# Patient Record
Sex: FEMALE
[De-identification: names, ages, dates, MRNs, and addresses within clinical notes are randomized per-mention and may not be internally consistent; named-entity substitution may affect disease eponyms.]

---

## 2019-07-16 ENCOUNTER — HOSPITAL ENCOUNTER (OUTPATIENT)
Dept: HOSPITAL 90 - LDH | Age: 24
Setting detail: OBSERVATION
LOS: 1 days | Discharge: HOME | End: 2019-07-17
Attending: SPECIALIST | Admitting: SPECIALIST
Payer: MEDICAID

## 2019-07-16 VITALS — DIASTOLIC BLOOD PRESSURE: 81 MMHG | SYSTOLIC BLOOD PRESSURE: 126 MMHG

## 2019-07-16 VITALS — DIASTOLIC BLOOD PRESSURE: 77 MMHG | SYSTOLIC BLOOD PRESSURE: 123 MMHG

## 2019-07-16 VITALS — HEIGHT: 65 IN | WEIGHT: 165 LBS | BODY MASS INDEX: 27.49 KG/M2

## 2019-07-16 VITALS — DIASTOLIC BLOOD PRESSURE: 85 MMHG | SYSTOLIC BLOOD PRESSURE: 123 MMHG

## 2019-07-16 DIAGNOSIS — Z3A.35: ICD-10-CM

## 2019-07-16 DIAGNOSIS — O00.01: Primary | ICD-10-CM

## 2019-07-16 DIAGNOSIS — Z79.01: ICD-10-CM

## 2019-07-16 DIAGNOSIS — O26.893: ICD-10-CM

## 2019-07-16 DIAGNOSIS — R51: ICD-10-CM

## 2019-07-16 LAB
ALBUMIN SERPL-MCNC: 2.4 G/DL (ref 3.5–5)
ALT SERPL-CCNC: 16 U/L (ref 12–78)
APTT PPP: 24.8 SEC (ref 26.3–35.5)
AST SERPL-CCNC: 17 U/L (ref 10–37)
BASOPHILS NFR BLD AUTO: 0.4 % (ref 0–5)
BILIRUB SERPL-MCNC: 0.2 MG/DL (ref 0.2–1)
BUN SERPL-MCNC: 9 MG/DL (ref 7–18)
CHLORIDE SERPL-SCNC: 102 MMOL/L (ref 101–111)
CO2 SERPL-SCNC: 26 MMOL/L (ref 21–32)
CREAT SERPL-MCNC: 0.6 MG/DL (ref 0.5–1.5)
EOSINOPHIL NFR BLD AUTO: 0.6 % (ref 0–8)
ERYTHROCYTE [DISTWIDTH] IN BLOOD BY AUTOMATED COUNT: 14.8 % (ref 11–15.5)
FIBRINOGEN PPP-MCNC: 484 MG/DL (ref 180–350)
GFR SERPL CREATININE-BSD FRML MDRD: 129 ML/MIN (ref 60–?)
GLUCOSE SERPL-MCNC: 70 MG/DL (ref 70–105)
HCT VFR BLD AUTO: 32.9 % (ref 36–48)
INR PPP: 0.88 (ref 0.85–1.15)
LYMPHOCYTES NFR SPEC AUTO: 20.2 % (ref 21–51)
MCH RBC QN AUTO: 29.3 PG (ref 27–33)
MCHC RBC AUTO-ENTMCNC: 33.1 G/DL (ref 32–36)
MCV RBC AUTO: 88.3 FL (ref 79–99)
MONOCYTES NFR BLD AUTO: 10 % (ref 3–13)
NEUTROPHILS NFR BLD AUTO: 68.8 % (ref 40–77)
NRBC BLD MANUAL-RTO: 0 % (ref 0–0.19)
PH UR STRIP: 7 [PH] (ref 5–8)
PLATELET # BLD AUTO: 276 K/UL (ref 130–400)
POTASSIUM SERPL-SCNC: 3.6 MMOL/L (ref 3.5–5.1)
PROT SERPL-MCNC: 6.6 G/DL (ref 6–8.3)
PROTHROMBIN TIME: 9.3 SEC (ref 9.6–11.6)
RBC # BLD AUTO: 3.73 MIL/UL (ref 4–5.5)
RBC #/AREA URNS HPF: (no result) /HPF (ref 0–1)
SODIUM SERPL-SCNC: 137 MMOL/L (ref 136–145)
SP GR UR STRIP: 1.01 (ref 1–1.03)
URATE SERPL-MCNC: 5.4 MG/DL (ref 2.6–7.2)
UROBILINOGEN UR STRIP-MCNC: 0.2 MG/DL (ref 0.2–1)
WBC # BLD AUTO: 11.5 K/UL (ref 4.8–10.8)
WBC #/AREA URNS HPF: (no result) /HPF (ref 0–1)

## 2019-07-16 PROCEDURE — 85610 PROTHROMBIN TIME: CPT

## 2019-07-16 PROCEDURE — 82575 CREATININE CLEARANCE TEST: CPT

## 2019-07-16 PROCEDURE — 84156 ASSAY OF PROTEIN URINE: CPT

## 2019-07-16 PROCEDURE — 85025 COMPLETE CBC W/AUTO DIFF WBC: CPT

## 2019-07-16 PROCEDURE — 36415 COLL VENOUS BLD VENIPUNCTURE: CPT

## 2019-07-16 PROCEDURE — 85384 FIBRINOGEN ACTIVITY: CPT

## 2019-07-16 PROCEDURE — 80053 COMPREHEN METABOLIC PANEL: CPT

## 2019-07-16 PROCEDURE — 59025 FETAL NON-STRESS TEST: CPT

## 2019-07-16 PROCEDURE — 85730 THROMBOPLASTIN TIME PARTIAL: CPT

## 2019-07-16 PROCEDURE — 81001 URINALYSIS AUTO W/SCOPE: CPT

## 2019-07-16 PROCEDURE — 84550 ASSAY OF BLOOD/URIC ACID: CPT

## 2019-07-16 NOTE — NUR
NST:

External fetal monitor applied to monitor Fetal Heart Tone ended at 2353. FHT baseline 130, 
with acceleration, no deceleration noted. Noted x 1 contraction with 60 seconds duration. 
Patient denies any pain.

## 2019-07-17 VITALS — SYSTOLIC BLOOD PRESSURE: 116 MMHG | DIASTOLIC BLOOD PRESSURE: 74 MMHG

## 2019-07-17 VITALS — SYSTOLIC BLOOD PRESSURE: 117 MMHG | DIASTOLIC BLOOD PRESSURE: 76 MMHG

## 2019-07-17 VITALS — DIASTOLIC BLOOD PRESSURE: 74 MMHG | SYSTOLIC BLOOD PRESSURE: 118 MMHG

## 2019-07-17 VITALS — DIASTOLIC BLOOD PRESSURE: 67 MMHG | SYSTOLIC BLOOD PRESSURE: 114 MMHG

## 2019-07-17 LAB
COLLECT DURATION TIME UR: 24 HR
COLLECT DURATION TIME UR: 24 HR
CREAT CL 24H UR+SERPL-VRATE: 132 ML/MIN (ref 75–115)
CREAT SERPL-MCNC: 0.6 MG/DL (ref 0.6–1.3)
CREAT UR-MCNC: 40 MG/DL (ref 95–135)
PROT 24H UR-MCNC: 7 MG/DL
PROT 24H UR-MRATE: 210 MG/24HR (ref 0–165)
SPECIMEN VOL 24H UR: 3000 ML
SPECIMEN VOL ?TM UR: 3000 ML

## 2019-07-17 NOTE — NUR
DISCHARGE

PATIENT LEFT UNIT VIA WHEELCHAIR WITH BELONGINGS IN HAND. PERSONAL VEHICLE USED FOR 
TRANSPORTATION. NO COMPLAINTS OR CONCERNS ADDRESSED FROM PATIENT ON DISCHARGE.

## 2023-01-13 ENCOUNTER — HOSPITAL ENCOUNTER (EMERGENCY)
Dept: HOSPITAL 90 - EDH | Age: 28
Discharge: HOME | End: 2023-01-13
Payer: COMMERCIAL

## 2023-01-13 VITALS — BODY MASS INDEX: 30.34 KG/M2 | HEIGHT: 65 IN | WEIGHT: 182.1 LBS

## 2023-01-13 VITALS — DIASTOLIC BLOOD PRESSURE: 63 MMHG | SYSTOLIC BLOOD PRESSURE: 101 MMHG

## 2023-01-13 DIAGNOSIS — Z3A.11: ICD-10-CM

## 2023-01-13 DIAGNOSIS — O26.891: Primary | ICD-10-CM

## 2023-01-13 DIAGNOSIS — R10.9: ICD-10-CM

## 2023-01-13 LAB
ALBUMIN SERPL-MCNC: 3.5 G/DL (ref 3.5–5)
ALT SERPL-CCNC: 19 U/L (ref 12–78)
APPEARANCE UR: CLEAR
AST SERPL-CCNC: 12 U/L (ref 10–37)
BASOPHILS NFR BLD AUTO: 0.4 % (ref 0–5)
BILIRUB UR QL STRIP: NEGATIVE MG/DL
BUN SERPL-MCNC: 8 MG/DL (ref 7–18)
CHLORIDE SERPL-SCNC: 99 MMOL/L (ref 101–111)
CO2 SERPL-SCNC: 27 MMOL/L (ref 21–32)
COLOR UR: (no result)
CREAT SERPL-MCNC: 0.6 MG/DL (ref 0.5–1.5)
EOSINOPHIL NFR BLD AUTO: 0.3 % (ref 0–8)
ERYTHROCYTE [DISTWIDTH] IN BLOOD BY AUTOMATED COUNT: 13 % (ref 11–15.5)
GFR SERPL CREATININE-BSD FRML MDRD: 126 ML/MIN (ref 60–?)
GLUCOSE SERPL-MCNC: 112 MG/DL (ref 70–105)
GLUCOSE UR STRIP-MCNC: NEGATIVE MG/DL
HCT VFR BLD AUTO: 34.3 % (ref 36–48)
HGB UR QL STRIP: NEGATIVE
KETONES UR STRIP-MCNC: NEGATIVE MG/DL
LEUKOCYTE ESTERASE UR QL STRIP: NEGATIVE LEU/UL
LYMPHOCYTES NFR SPEC AUTO: 14.1 % (ref 21–51)
MCH RBC QN AUTO: 28.4 PG (ref 27–33)
MCHC RBC AUTO-ENTMCNC: 34.4 G/DL (ref 32–36)
MCV RBC AUTO: 82.7 FL (ref 79–99)
MONOCYTES NFR BLD AUTO: 4.9 % (ref 3–13)
NEUTROPHILS NFR BLD AUTO: 79.9 % (ref 40–77)
NITRITE UR QL STRIP: NEGATIVE
NRBC BLD MANUAL-RTO: 0 % (ref 0–0.19)
PH UR STRIP: 6 [PH] (ref 5–8)
PLATELET # BLD AUTO: 309 K/UL (ref 130–400)
POTASSIUM SERPL-SCNC: 3.2 MMOL/L (ref 3.5–5.1)
PROT SERPL-MCNC: 7.9 G/DL (ref 6–8.3)
PROT UR QL STRIP: NEGATIVE MG/DL
RBC # BLD AUTO: 4.15 MIL/UL (ref 4–5.5)
SODIUM SERPL-SCNC: 133 MMOL/L (ref 136–145)
SP GR UR STRIP: 1.01 (ref 1–1.03)
UROBILINOGEN UR STRIP-MCNC: 0.2 MG/DL (ref 0.2–1)
WBC # BLD AUTO: 13.2 K/UL (ref 4.8–10.8)

## 2023-01-13 PROCEDURE — 36415 COLL VENOUS BLD VENIPUNCTURE: CPT

## 2023-01-13 PROCEDURE — 80053 COMPREHEN METABOLIC PANEL: CPT

## 2023-01-13 PROCEDURE — 81003 URINALYSIS AUTO W/O SCOPE: CPT

## 2023-01-13 PROCEDURE — 85025 COMPLETE CBC W/AUTO DIFF WBC: CPT

## 2023-01-13 PROCEDURE — 76801 OB US < 14 WKS SINGLE FETUS: CPT

## 2023-01-13 PROCEDURE — 84702 CHORIONIC GONADOTROPIN TEST: CPT

## 2024-12-20 ENCOUNTER — HOSPITAL ENCOUNTER (OUTPATIENT)
Dept: HOSPITAL 90 - RAH | Age: 29
Discharge: HOME | End: 2024-12-20
Attending: INTERNAL MEDICINE
Payer: COMMERCIAL

## 2024-12-20 DIAGNOSIS — N63.14: Primary | ICD-10-CM

## 2024-12-20 PROCEDURE — 76641 ULTRASOUND BREAST COMPLETE: CPT

## 2024-12-20 NOTE — [facility]
US BREAST COMPLETE UNILATERAL



REASON:  unspecifed lump on breast.



COMPARISON: None 



TECHNIQUE: Right breast ultrasound study was performed. 



FINDINGS: Dense fibroglandular tissue is seen of right breast. At the

region of interest at 5:00 of right breast, no evidence of cystic or

hypoechoic mass is seen. Right axillary lymph nodes are seen measuring

13 x 8 x 15 mm at 14 x 8 x 16 mm each. No evidence of cystic or

hypoechoic mass is seen



IMPRESSION: 

  No evidence of cystic or hypoechoic mass is seen.







CATEGORY 2: BENIGN FINDINGS



Recommend monthly self breast exam as well as annual clinical

examination.